# Patient Record
Sex: FEMALE | Race: WHITE
[De-identification: names, ages, dates, MRNs, and addresses within clinical notes are randomized per-mention and may not be internally consistent; named-entity substitution may affect disease eponyms.]

---

## 2019-08-08 NOTE — EKG
Test Date:    2019-08-08               Test Time:    11:22:06

Technician:   BETHANY                                    

                                                     

MEASUREMENT RESULTS:                                       

Intervals:                                           

Rate:         55                                     

RI:           168                                    

QRSD:         72                                     

QT:           430                                    

QTc:          411                                    

Axis:                                                

P:            27                                     

RI:           168                                    

QRS:          26                                     

T:            34                                     

                                                     

INTERPRETIVE STATEMENTS:                                       

                                                     

Sinus bradycardia

Otherwise normal ECG

No previous ECG available for comparison



Electronically Signed On 08-08-19 16:25:45 CDT by Mark Valle

## 2019-08-09 ENCOUNTER — HOSPITAL ENCOUNTER (OUTPATIENT)
Dept: HOSPITAL 97 - OR | Age: 59
Discharge: HOME | End: 2019-08-09
Attending: OTOLARYNGOLOGY
Payer: COMMERCIAL

## 2019-08-09 DIAGNOSIS — J34.89: Primary | ICD-10-CM

## 2019-08-09 DIAGNOSIS — F32.9: ICD-10-CM

## 2019-08-09 DIAGNOSIS — Z86.718: ICD-10-CM

## 2019-08-09 DIAGNOSIS — Z79.899: ICD-10-CM

## 2019-08-09 DIAGNOSIS — L90.5: ICD-10-CM

## 2019-08-09 PROCEDURE — 09BK8ZZ EXCISION OF NASAL MUCOSA AND SOFT TISSUE, VIA NATURAL OR ARTIFICIAL OPENING ENDOSCOPIC: ICD-10-PCS

## 2019-08-09 PROCEDURE — 93005 ELECTROCARDIOGRAM TRACING: CPT

## 2019-08-09 PROCEDURE — 30999 UNLISTED PROCEDURE NOSE: CPT

## 2019-08-09 RX ADMIN — OXYMETAZOLINE HYDROCHLORIDE ONE APPL: 5 SPRAY NASAL at 09:30

## 2019-08-09 RX ADMIN — MORPHINE SULFATE ONE MG: 4 INJECTION, SOLUTION INTRAMUSCULAR; INTRAVENOUS at 10:42

## 2019-08-09 RX ADMIN — OXYMETAZOLINE HYDROCHLORIDE ONE APPL: 5 SPRAY NASAL at 08:21

## 2019-08-09 RX ADMIN — MORPHINE SULFATE ONE MG: 4 INJECTION, SOLUTION INTRAMUSCULAR; INTRAVENOUS at 10:29

## 2019-08-09 RX ADMIN — MORPHINE SULFATE ONE MG: 4 INJECTION, SOLUTION INTRAMUSCULAR; INTRAVENOUS at 10:36

## 2019-08-09 RX ADMIN — MORPHINE SULFATE ONE MG: 4 INJECTION, SOLUTION INTRAMUSCULAR; INTRAVENOUS at 10:48

## 2019-08-09 RX ADMIN — OXYMETAZOLINE HYDROCHLORIDE ONE APPL: 5 SPRAY NASAL at 08:11

## 2019-08-09 RX ADMIN — OXYMETAZOLINE HYDROCHLORIDE ONE APPL: 5 SPRAY NASAL at 08:16

## 2019-08-09 NOTE — P.BOP
Preoperative diagnosis: nasal stenosis


Postoperative diagnosis: same


Primary procedure: NE with excision of nasal lesion


Assistant: NONE,NONE


Estimated blood loss: 5ml


Specimen: none


Anesthesia: General


Complications: None


Implants: left emerson splint and gelfoam


Fluids & blood products: crystalloid 400ml


Transferred to: Recovery Room


Condition: Good

## 2019-08-10 NOTE — OP
Date of Procedure:  08/09/2019



Surgeon:  Emmy Alvarez MD



Preoperative Diagnosis:  Left nasal scar and nasal obstruction.



Postoperative Diagnosis:  Left nasal scar and nasal obstruction.



Procedure:  Nasal endoscopy with excision of scar and placement of Lucas splint.



Indication For Procedure:  Ms. Boyer underwent control of epistaxis at an outside facility in Januar
y of 2019.  The patient reported at that time, she was gushing blood in the emergency room.  They cau
terized and packed the nose and she presented for evaluation.  At that time, the patient was on Xarel
to for a left DVT.  At that time, the left nasal cavity had crusting and abnormal appearance with a f
ibrinous tissue versus septal mass.  Biopsy was taken at that time and demonstrated acute inflammatio
n with no sign of cancer.  At her followup visit, the patient was not having any bleeding, but exam d
emonstrated a thick scar band, which developed between the septum and the lateral nasal wall causing 
obstruction of the lateral nasal valve.  The patient presented this summer for treatment of her nasal
 valve obstruction with desire for surgical treatment.  She completed her Xarelto and was no longer o
n blood thinners.  Over the interim 6 months, patient had no significant bleeding from the nose.  The
 risks, benefits, and alternatives to the procedure were discussed with the patient, who agreed to pr
oceed.



Procedure In Detail:  The patient was brought to the operating room.  She was placed under general an
esthesia via oral endotracheal tube.  The head of bed was turned 90 degrees.  The patient's face was 
prepped in standard fashion for sinus surgery.  The nasal cavity was examined using a speculum and he
adlight.  A thick scar band was noted in the nasal cavity between the lateral nasal wall and the sept
um.  Photo documentation of the scar band was made using an endoscope.  The area of scar was injected
 with lidocaine and a 45-degree Abad-Cut was used to divide the scar band releasing the lateral nasal 
wall from the septum entirely.  The area was packed with Afrin-soaked pledgets.  Photo documentation 
of the contralateral right nasal cavity was made for patient education and contrast.  The area of the
 right septum was carefully cauterized with hopes of avoiding additional future bleeding.  The nasal 
endoscopy of the middle meatus revealed a small scar band from the middle turbinate to the lateral na
sal wall, but prior maxillary antrostomy appeared patent.  There was no infection or crusting and dec
ision was made to forego division of the middle turbinate scar as it was less likely contributing to 
patient's nasal obstructive symptoms based on the time, course, and development of her symptoms.  In 
order to prevent re-adhesion of the lateral nasal wall to the septum, a Lucas splint was trimmed and 
placed, positioned within the nasal cavity.  It was secured using a nylon suture to the membranous se
ptum.  A Gelfoam dissolvable packing was then wedged between the splint and the lateral nasal wall to
 apply tamponade to the oozing from this area.  Bleeding was well controlled and the patient's nasoph
arynx, nasal cavity were suctioned.  Patient was then returned to care of Anesthesia, awakened and br
ought to the recovery room in stable condition.



Complications:  None.



Specimens:  None.



Disposition:  Patient will be discharged home later today and follow up with Dr. Alvarez in [facility] 10 days for removal of the splint.





VERONICA/SKYLER

DD:  08/09/2019 17:57:29Voice ID:  [id number]

DT:  08/10/2019 03:46:00Report ID:  [id number]